# Patient Record
(demographics unavailable — no encounter records)

---

## 2025-04-25 NOTE — REVIEW OF SYSTEMS
[Headache] : no headache [Eye Discharge] : no eye discharge [Eye Redness] : no eye redness [Itchy Eyes] : itchy eyes [Ear Pain] : no ear pain [Nasal Discharge] : no nasal discharge [Nasal Congestion] : no nasal congestion [Sore Throat] : no sore throat [Rash] : rash [Itching] : itching [Hives] : hives [Negative] : Genitourinary

## 2025-04-25 NOTE — DISCUSSION/SUMMARY
[FreeTextEntry1] : Anticipatory guidance and parent education given. Pt with h/o nut and seasonal allergies, hives on PE. May take Zyrtec twice daily for acute treatment of hives. May use OTC Zaditor eyedrops for ocular allergies. Avoid exposure to environmental allergens.  Wash hands and change clothing after being outdoors.  Shower nightly prior to going to bed. Use nasal saline 2-3 times daily. Follow up as needed for persistent or worsening symptoms. Parent understands and agrees with plan.

## 2025-04-25 NOTE — HISTORY OF PRESENT ILLNESS
[de-identified] : rash [FreeTextEntry6] : 5y4m old boy with h/o peanut/tree nut allergy BIB mother with c/o itchy rash since last night. Pt had eaten ice cream with sprinkles (that he has eaten before) prior to noticing rash. No new medications, products or exposures. Pt also with h/o JERAMY, on daily Zyrtec. Eyes have been itchy. No fever. No SOB, difficulty breathing, chest pain, cough, stridor or wheeze. No n/v/d. No headache, abdominal pain, sore throat. No body aches or fatigue. No urinary complaints. Good po/uop/bm. Normal sleep and activity.